# Patient Record
Sex: MALE | Race: WHITE
[De-identification: names, ages, dates, MRNs, and addresses within clinical notes are randomized per-mention and may not be internally consistent; named-entity substitution may affect disease eponyms.]

---

## 2022-07-22 ENCOUNTER — HOSPITAL ENCOUNTER (EMERGENCY)
Dept: HOSPITAL 97 - ER | Age: 47
Discharge: HOME | End: 2022-07-22
Payer: COMMERCIAL

## 2022-07-22 VITALS — TEMPERATURE: 98.5 F

## 2022-07-22 VITALS — OXYGEN SATURATION: 97 %

## 2022-07-22 VITALS — SYSTOLIC BLOOD PRESSURE: 151 MMHG | DIASTOLIC BLOOD PRESSURE: 109 MMHG

## 2022-07-22 DIAGNOSIS — R07.89: Primary | ICD-10-CM

## 2022-07-22 DIAGNOSIS — I11.9: ICD-10-CM

## 2022-07-22 DIAGNOSIS — I10: ICD-10-CM

## 2022-07-22 DIAGNOSIS — N19: ICD-10-CM

## 2022-07-22 LAB
ALBUMIN SERPL BCP-MCNC: 3.3 G/DL (ref 3.4–5)
ALP SERPL-CCNC: 69 U/L (ref 45–117)
ALT SERPL W P-5'-P-CCNC: 73 U/L (ref 12–78)
AST SERPL W P-5'-P-CCNC: 29 U/L (ref 15–37)
BUN BLD-MCNC: 19 MG/DL (ref 7–18)
GLUCOSE SERPLBLD-MCNC: 119 MG/DL (ref 74–106)
HCT VFR BLD CALC: 46.4 % (ref 39.6–49)
INR BLD: 0.98
LYMPHOCYTES # SPEC AUTO: 1.6 K/UL (ref 0.7–4.9)
MAGNESIUM SERPL-MCNC: 1.7 MG/DL (ref 1.8–2.4)
MCV RBC: 93.4 FL (ref 80–100)
NT-PROBNP SERPL-MCNC: 27 PG/ML (ref ?–125)
PMV BLD: 7.3 FL (ref 7.6–11.3)
POTASSIUM SERPL-SCNC: 4.1 MMOL/L (ref 3.5–5.1)
RBC # BLD: 4.97 M/UL (ref 4.33–5.43)
TROPONIN I SERPL HS-MCNC: 3.2 PG/ML (ref ?–58.9)

## 2022-07-22 PROCEDURE — 83880 ASSAY OF NATRIURETIC PEPTIDE: CPT

## 2022-07-22 PROCEDURE — 85610 PROTHROMBIN TIME: CPT

## 2022-07-22 PROCEDURE — 80076 HEPATIC FUNCTION PANEL: CPT

## 2022-07-22 PROCEDURE — 99284 EMERGENCY DEPT VISIT MOD MDM: CPT

## 2022-07-22 PROCEDURE — 84484 ASSAY OF TROPONIN QUANT: CPT

## 2022-07-22 PROCEDURE — 80048 BASIC METABOLIC PNL TOTAL CA: CPT

## 2022-07-22 PROCEDURE — 82550 ASSAY OF CK (CPK): CPT

## 2022-07-22 PROCEDURE — 83735 ASSAY OF MAGNESIUM: CPT

## 2022-07-22 PROCEDURE — 85025 COMPLETE CBC W/AUTO DIFF WBC: CPT

## 2022-07-22 PROCEDURE — 93005 ELECTROCARDIOGRAM TRACING: CPT

## 2022-07-22 PROCEDURE — 36415 COLL VENOUS BLD VENIPUNCTURE: CPT

## 2022-07-22 PROCEDURE — 96360 HYDRATION IV INFUSION INIT: CPT

## 2022-07-22 PROCEDURE — 71045 X-RAY EXAM CHEST 1 VIEW: CPT

## 2022-07-22 NOTE — EDPHYS
Physician Documentation                                                                           

 [facility]                                                                 

Name: Barry Beltran                                                                               

Age: 46 yrs                                                                                       

Sex: Male                                                                                         

: 1975                                                                                   

MRN: [id number]                                                                                   

Arrival Date: 2022                                                                          

Time: 16:30                                                                                       

Account#: [id number]                                                                            

Bed 24                                                                                            

Private MD:                                                                                       

ED Physician Jose Aguilar                                                                         

HPI:                                                                                              

                                                                                             

16:55 This 46 yrs old Male presents to ER via Ambulatory with complaints of Chest Pain.       cp  

16:55 The patient or guardian reports chest pain that is located primarily in the anterior    cp  

      chest wall.                                                                                 

16:55 Onset: 1 week(s) ago. The pain does not radiate. Associated signs and symptoms:         cp  

      Pertinent negatives: abdominal pain, cough, diaphoresis, dizziness, headache, lower         

      extremity pain, lower extremity swelling, shortness of breath, syncope. The chest pain      

      is described as a pressure. Duration: The patient or guardian reports multiple              

      episodes, that wax and wane. Modifying factors: The symptoms are alleviated by nothing.     

      the symptoms are aggravated by nothing. Severity of pain: in the emergency department       

      the pain is unchanged despite home interventions. Patient reports history of HTN and        

      taking prescribed Lisinopril in the past. Reports running out of medication about 1         

      month ago. Patient denies family history of early cardiac death and/or first degree         

      relative with MI at young age.                                                              

                                                                                                  

Historical:                                                                                       

- Allergies:                                                                                      

16:54 No Known Allergies;                                                                     ld1 

- Home Meds:                                                                                      

16:54 None [Active];                                                                          ld1 

- PMHx:                                                                                           

16:54 Hypertensive disorder; Hypercholesterolemia;                                            ld1 

- PSHx:                                                                                           

16:54 None;                                                                                   ld1 

                                                                                                  

- Immunization history:: Adult Immunizations up to date, Client reports receiving the             

  2nd dose of the Covid vaccine.                                                                  

- Social history:: Smoking status: Patient denies any tobacco usage or history of.                

  Patient uses alcohol, on a daily basis.                                                         

                                                                                                  

                                                                                                  

ROS:                                                                                              

17:00 Constitutional: Negative for body aches, chills, fever, poor PO intake.                 cp  

17:00 Cardiovascular: Positive for chest pain, Negative for edema, palpitations.              cp  

17:00 Respiratory: Negative for cough, shortness of breath, wheezing.                             

17:00 Eyes: Negative for injury, pain, redness, and discharge.                                cp  

17:00 Abdomen/GI: Negative for abdominal pain, nausea, vomiting, and diarrhea.                    

17:00 ENT: Negative for drainage from ear(s), ear pain, sore throat, difficulty swallowing,   cp  

      difficulty handling secretions.                                                             

17:00 Back: Negative for pain at rest, pain with movement.                                        

17:00 : Negative for urinary symptoms.                                                          

17:00 Neuro: Negative for altered mental status, dizziness, headache, numbness, syncope,          

      weakness.                                                                                   

17:00 All other systems are negative.                                                             

                                                                                                  

Exam:                                                                                             

17:00 ECG was reviewed by the Attending Physician.                                            cp  

17:05 Constitutional: The patient appears in no acute distress, alert, awake,                 cp  

      non-diaphoretic, non-toxic, well developed, well nourished.                                 

17:05 Head/Face:  Normocephalic, atraumatic.                                                  cp  

17:05 Eyes: Periorbital structures: appear normal, Conjunctiva: normal, no exudate, no            

      injection, Sclera: no appreciated abnormality, Lids and lashes: appear normal,              

      bilaterally.                                                                                

17:05 ENT: External ear(s): are unremarkable, Nose: is normal, Mouth: Lips: moist, Oral           

      mucosa: pink and intact, moist, Posterior pharynx: Airway: no evidence of obstruction,      

      patent.                                                                                     

17:05 Chest/axilla: Inspection: normal, Palpation: is normal, no crepitus, no tenderness.         

17:05 Cardiovascular: Rate: normal, Rhythm: regular, Edema: is not appreciated.                   

17:05 Respiratory: the patient does not display signs of respiratory distress,  Respirations:     

      normal, no use of accessory muscles, no retractions, labored breathing, is not present,     

      Breath sounds: are clear throughout, no decreased breath sounds, no stridor, no             

      wheezing.                                                                                   

17:05 Abdomen/GI: Inspection: abdomen appears normal, Palpation: abdomen is soft and              

      non-tender, in all quadrants.                                                               

17:05 Back: pain, is absent, ROM is normal.                                                       

17:05 Neuro: Orientation: to person, place \T\ time. Mentation: is normal, Cerebellar function:   

      is grossly normal, Motor: moves all fours, strength is normal, Sensation: is normal.        

                                                                                                  

Vital Signs:                                                                                      

16:53  / 108; Pulse 90; Resp 18; Temp 98.5(O); Pulse Ox 98% on R/A; Weight 99.79 kg;    ld1 

      Height 6 ft. 0 in. (182.88 cm); Pain 4/10;                                                  

18:06  / 100; Pulse 80; Resp 16; Pulse Ox 97% on R/A;                                   jb4 

19:15  / 102; Pulse 76; Resp 16; Pulse Ox 99% on R/A;                                   jb4 

20:21  / 102; Pulse 68; Resp 18; Pulse Ox 97% on R/A;                                   jb4 

21:15  / 109; Pulse 62; Resp 15; Pulse Ox 97% on R/A;                                   jb4 

16:53 Body Mass Index 29.84 (99.79 kg, 182.88 cm)                                             [facility] 

                                                                                                  

MDM:                                                                                              

17:00 Differential diagnosis: acute myocardial infarction, pleurisy, pneumonia, pneumothorax, cp  

      stable angina, unstable angina.                                                             

17:19 Patient medically screened.                                                             cp  

21:25 Data reviewed: vital signs, nurses notes, lab test result(s), EKG, radiologic studies,  cp  

      plain films.                                                                                

21:25 Test interpretation: by ED physician or midlevel provider: ECG, plain radiologic        cp  

      studies. Counseling: I had a detailed discussion with the patient and/or guardian           

      regarding: the historical points, exam findings, and any diagnostic results supporting      

      the discharge/admit diagnosis, lab results, radiology results, the need for outpatient      

      follow up, a family practitioner, to return to the emergency department if symptoms         

      worsen or persist or if there are any questions or concerns that arise at home.             

      Response to treatment: the patient's symptoms have mildly improved after treatment, and     

      as a result, I will discharge patient.                                                      

                                                                                                  

                                                                                             

16:52 Order name: Basic Metabolic Panel; Complete Time: 17:53                                                                                                                              

17:53 Interpretation: Normal except: GLUC 119; BUN 19; CRE 2.10; GFR 39.                      cp  

                                                                                             

16:52 Order name: CBC with Diff; Complete Time: 17:53                                                                                                                                      

16:52 Order name: Troponin HS; Complete Time: 17:53                                                                                                                                        

17:03 Order name: LFT's; Complete Time: 17:53                                                   

                                                                                             

17:03 Order name: Magnesium; Complete Time: 17:53                                             cp  

                                                                                             

17:03 Order name: NT PRO-BNP; Complete Time: 17:53                                            cp  

                                                                                             

16:52 Order name: XRAY Chest (1 view)                                                         [facility] 

                                                                                             

17:03 Order name: PT-INR; Complete Time: 17:53                                                cp  

                                                                                             

17:54 Order name: CK; Complete Time: 21:04                                                    cp  

                                                                                             

19:40 Order name: Troponin High Sensitivity: repeat 2030; Complete Time: 21:24                cp  

                                                                                             

16:52 Order name: EKG; Complete Time: 16:53                                                                                                                                                

16:52 Order name: Cardiac monitoring; Complete Time: 17:17                                                                                                                                 

16:52 Order name: EKG - Nurse/Tech; Complete Time: 17:16                                      ld                                                                                             

16:52 Order name: IV Saline Lock; Complete Time: 17:16                                                                                                                                     

16:52 Order name: Labs collected and sent; Complete Time: 17:16                               ld                                                                                             

16:52 Order name: O2 Per Protocol; Complete Time: 16:53                                       ld                                                                                             

16:52 Order name: O2 Sat Monitoring; Complete Time: 16:53                                     ld 

                                                                                                  

EC:00 Rate is 84 beats/min. Rhythm is regular. NM interval is normal. QRS interval is normal. cp  

      QT interval is normal. T waves are Inverted in lead aVR. Interpreted by me. Reviewed by     

      me.                                                                                         

                                                                                                  

Administered Medications:                                                                         

17:29 Drug: Aspirin Chewable Tablet 324 mg Route: PO;                                         jb4 

18:13 Follow up: Response: No adverse reaction                                                [facility] 

17:29 Drug: Nitroglycerin 0.4 mg Route: Sublingual;                                           jb4 

18:12 Follow up: Response: No adverse reaction; No change in condition                        [facility] 

18:12 Drug: NS 0.9% 1000 ml Route: IV; Rate: 1 bolus; Site: right antecubital;                jb4 

19:15 Follow up: Response: No adverse reaction; IV Status: Completed infusion; IV Intake:     jb4 

      1000ml                                                                                      

19:20 Drug: Metoprolol 25 mg Route: PO;                                                         

21:38 Follow up: Response: No adverse reaction                                                jb4 

                                                                                                  

                                                                                                  

Disposition Summary:                                                                              

22 21:25                                                                                    

Discharge Ordered                                                                                 

      Location: Home                                                                          jmm 

      Problem: new                                                                            jmm 

      Symptoms: have improved                                                                 jmm 

      Condition: Stable                                                                       jmm 

      Diagnosis                                                                                   

        - Chest pain, unspecified                                                             jmm 

        - Hypertensive heart disease without heart failure                                    jmm 

        - Unspecified kidney failure                                                          jmm 

      Followup:                                                                               cp  

        - With: Private Physician                                                                  

        - When: 2 - 3 days                                                                         

        - Reason: Recheck today's complaints                                                       

      Discharge Instructions:                                                                     

        - Discharge Summary Sheet                                                             cp  

        - Nonspecific Chest Pain, Adult                                                       cp  

        - Hypertension, Adult                                                                 cp  

        - Aspirin and Your Heart                                                              cp  

        - Form - Blood Pressure Record Sheet                                                  cp  

        - How to Take Your Blood Pressure                                                     cp  

      Forms:                                                                                      

        - Medication Reconciliation Form                                                      jm 

        - Thank You Letter                                                                    jmm 

        - Antibiotic Education                                                                jmm 

        - Prescription Opioid Use                                                             [facility] 

      Prescriptions:                                                                              

        - Metoprolol Tartrate 25 mg Oral Tablet                                                    

            - take 1 tablet by ORAL route 2 times per day with a meal; 60 tablet; Refills: 0, [facility] 

      Product Selection Permitted                                                                 

Signatures:                                                                                       

Dispatcher [employer]                           Magi Lilly RN RN kl Mickail, Joel, PA PA jmm Page, Corey, PA PA cp Bryson, James, RN RN   jb4                                                  

Lisa Peña RN                     RN   ld1                                                  

                                                                                                  

Corrections: (The following items were deleted from the chart)                                    

                                                                                             

20:05  16:55 Patient reports history of HTN and taking prescribed Lisinopril in the      cp  

      past. Reports running out of medication about 1 month ago. cp                               

                                                                                                  

**************************************************************************************************

## 2022-07-22 NOTE — ER
Nurse's Notes                                                                                     

 [facility]                                                                 

Name: Barry Beltran                                                                               

Age: 46 yrs                                                                                       

Sex: Male                                                                                         

: 1975                                                                                   

MRN: [id number]                                                                                   

Arrival Date: 2022                                                                          

Time: 16:30                                                                                       

Account#: [id number]                                                                            

Bed 24                                                                                            

Private MD:                                                                                       

Diagnosis: Chest pain, unspecified;Hypertensive heart disease without heart failure;Unspecified   

  kidney failure                                                                                  

                                                                                                  

Presentation:                                                                                     

                                                                                             

16:53 Chief complaint: Patient states: Chest pain on and off all week. Reports pain being     ld1 

      worse today. Stated, "I am out of my blood pressure pills, I haven't taken them in over     

      a month.". Coronavirus screen: At this time, the client does not indicate any symptoms      

      associated with coronavirus-19. Ebola Screen: No symptoms or risks identified at this       

      time. Initial Sepsis Screen: Does the patient meet any 2 criteria? No. Patient's            

      initial sepsis screen is negative. Does the patient have a suspected source of              

      infection? No. Patient's initial sepsis screen is negative. Risk Assessment: Do you         

      want to hurt yourself or someone else? Patient reports no desire to harm self or            

      others. Onset of symptoms was 2022.                                                

16:53 Method Of Arrival: Ambulatory                                                           ld1 

16:53 Acuity: ZAHRAA 3                                                                           ld1 

                                                                                                  

Triage Assessment:                                                                                

16:54 General: Appears in no apparent distress. comfortable, Behavior is cooperative,         ld1 

      appropriate for age, anxious. Pain: Complains of pain in chest Pain does not radiate.       

      Pain currently is 4 out of 10 on a pain scale. at worst was 10 out of 10 on a pain          

      scale. Quality of pain is described as pressure, throbbing. EENT: No signs and/or           

      symptoms were reported regarding the EENT system. Neuro: Level of Consciousness is          

      awake, alert, obeys commands, Oriented to person, place, time, situation, Appropriate       

      for age. Cardiovascular: Capillary refill < 3 seconds Patient's skin is warm and dry.       

      Rhythm is regular. Respiratory: Airway is patent Respiratory effort is even, unlabored.     

      GI: Abdomen is round non-distended. : No signs and/or symptoms were reported              

      regarding the genitourinary system. Derm: No signs and/or symptoms reported regarding       

      the dermatologic system. Musculoskeletal: No signs and/or symptoms reported regarding       

      the musculoskeletal system.                                                                 

                                                                                                  

Historical:                                                                                       

- Allergies:                                                                                      

16:54 No Known Allergies;                                                                     ld1 

- Home Meds:                                                                                      

16:54 None [Active];                                                                          ld1 

- PMHx:                                                                                           

16:54 Hypertensive disorder; Hypercholesterolemia;                                            ld1 

- PSHx:                                                                                           

16:54 None;                                                                                   ld1 

                                                                                                  

- Immunization history:: Adult Immunizations up to date, Client reports receiving the             

  2nd dose of the Covid vaccine.                                                                  

- Social history:: Smoking status: Patient denies any tobacco usage or history of.                

  Patient uses alcohol, on a daily basis.                                                         

                                                                                                  

                                                                                                  

Screenin:00 Abuse screen: Denies threats or abuse. Nutritional screening: No deficits noted.        jb4 

      Tuberculosis screening: No symptoms or risk factors identified. Fall Risk None              

      identified.                                                                                 

                                                                                                  

Assessment:                                                                                       

17:34 General: Appears in no apparent distress. uncomfortable, Behavior is calm, cooperative, jb4 

      appropriate for age. Pain: Complains of pain in chest Pain does not radiate. Pain           

      currently is 4 out of 10 on a pain scale. Quality of pain is described as pressure.         

      Neuro: Level of Consciousness is awake, alert, obeys commands, Oriented to person,          

      place, time, situation. Cardiovascular: Patient's skin is warm and dry. Respiratory:        

      Airway is patent Respiratory effort is even, unlabored, Respiratory pattern is regular,     

      symmetrical. Derm: Skin is intact, Skin is pink, warm \T\ dry. Musculoskeletal:             

      Circulation, motion, and sensation intact. Range of motion: intact in all extremities.      

18:06 Reassessment: Patient appears in no apparent distress at this time. Patient and/or      jb4 

      family updated on plan of care and expected duration. Pain level reassessed. Patient is     

      alert, oriented x 3, equal unlabored respirations, skin warm/dry/pink.                      

19:20 Reassessment: Patient appears in no apparent distress at this time. Patient and/or      jb4 

      family updated on plan of care and expected duration. Pain level reassessed. Patient is     

      alert, oriented x 3, equal unlabored respirations, skin warm/dry/pink.                      

20:21 Reassessment: No changes from previously documented assessment. Patient and/or family   jb4 

      updated on plan of care and expected duration. Pain level reassessed. Patient is alert,     

      oriented x 3, equal unlabored respirations, skin warm/dry/pink.                             

21:24 Reassessment: Patient appears in no apparent distress at this time. Patient and/or      jb4 

      family updated on plan of care and expected duration. Pain level reassessed. Patient is     

      alert, oriented x 3, equal unlabored respirations, skin warm/dry/pink.                      

                                                                                                  

Vital Signs:                                                                                      

16:53  / 108; Pulse 90; Resp 18; Temp 98.5(O); Pulse Ox 98% on R/A; Weight 99.79 kg;    ld1 

      Height 6 ft. 0 in. (182.88 cm); Pain 4/10;                                                  

18:06  / 100; Pulse 80; Resp 16; Pulse Ox 97% on R/A;                                   jb4 

19:15  / 102; Pulse 76; Resp 16; Pulse Ox 99% on R/A;                                   jb4 

20:21  / 102; Pulse 68; Resp 18; Pulse Ox 97% on R/A;                                   jb4 

21:15  / 109; Pulse 62; Resp 15; Pulse Ox 97% on R/A;                                   jb4 

16:53 Body Mass Index 29.84 (99.79 kg, 182.88 cm)                                             ld1 

                                                                                                  

ED Course:                                                                                        

16:30 Patient arrived in ED.                                                                  mr  

16:39 Harjinder Green PA is PHCP.                                                                cp  

16:39 Jose Aguilar MD is Attending Physician.                                                cp  

16:54 Triage completed.                                                                       ld1 

16:54 Arm band placed on right wrist. EKG completed in triage. Results shown to MD.           ld1 

17:15 Patient maintains SpO2 saturation greater than 95% on room air.                         jb4 

17:16 Inserted saline lock: 20 gauge in right antecubital area, using aseptic technique.      kc6 

      Blood collected.                                                                            

17:16 LFT's Sent.                                                                             kc6 

17:16 Magnesium Sent.                                                                         kc6 

17:16 NT PRO-BNP Sent.                                                                        kc6 

17:16 PT-INR Sent.                                                                            kc6 

17:16 Basic Metabolic Panel Sent.                                                             kc6 

17:16 CBC with Diff Sent.                                                                     kc6 

17:16 Troponin HS Sent.                                                                       kc6 

17:19 Joe Butt, RN is Primary Nurse.                                                     jb4 

17:36 XRAY Chest (1 view) In Process Unspecified.                                             EDMS

21:03 PHCP role handed off by Harjinder Green PA                                                 [facility] 

21:03 Sincere Zhong PA is PHCP.                                                              [facility] 

21:38 No provider procedures requiring assistance completed. IV discontinued, intact,         jb4 

      bleeding controlled, No redness/swelling at site. Pressure dressing applied.                

                                                                                                  

Administered Medications:                                                                         

17:29 Drug: Aspirin Chewable Tablet 324 mg Route: PO;                                         jb4 

18:13 Follow up: Response: No adverse reaction                                                jb4 

17:29 Drug: Nitroglycerin 0.4 mg Route: Sublingual;                                           jb4 

18:12 Follow up: Response: No adverse reaction; No change in condition                        jb4 

18:12 Drug: NS 0.9% 1000 ml Route: IV; Rate: 1 bolus; Site: right antecubital;                jb4 

19:15 Follow up: Response: No adverse reaction; IV Status: Completed infusion; IV Intake:     jb4 

      1000ml                                                                                      

19:20 Drug: Metoprolol 25 mg Route: PO;                                                         

21:38 Follow up: Response: No adverse reaction                                                jb4 

                                                                                                  

                                                                                                  

Intake:                                                                                           

19:15 IV: 1000ml; Total: 1000ml.                                                              jb4 

                                                                                                  

Outcome:                                                                                          

21:25 Discharge ordered by MD.                                                                shanice 

21:38 Discharged to home ambulatory.                                                          jb4 

21:38 Condition: stable                                                                           

21:38 Discharge instructions given to patient, Instructed on discharge instructions, follow       

      up and referral plans. medication usage, Demonstrated understanding of instructions,        

      follow-up care, medications, Prescriptions given X 1.                                       

21:39 Patient left the ED.                                                                    jb4 

                                                                                                  

Signatures:                                                                                       

Dispatcher [employer]                           EDMS                                                 

Magi Forbes, Sincere Arnold RN, PA PA jmm Rivera, Mary                                 mr                                                   

Harjinder Green PA PA cp Bryson, James, RN RN   jb4                                                  

Lisa Peña RN                     RN   ld1                                                  

Lian Van                            kc6                                                  

                                                                                                  

**************************************************************************************************

## 2022-07-25 NOTE — EKG
Test Date:    2022-07-22               Test Time:    16:52:12

Technician:   DAMION                                    

                                                     

MEASUREMENT RESULTS:                                       

Intervals:                                           

Rate:         84                                     

MI:           170                                    

QRSD:         82                                     

QT:           358                                    

QTc:          423                                    

Axis:                                                

P:            46                                     

MI:           170                                    

QRS:          88                                     

T:            51                                     

                                                     

INTERPRETIVE STATEMENTS:                                       

                                                     

Normal sinus rhythm

Normal ECG

Compared to ECG 12/12/2014 08:22:19

Sinus bradycardia no longer present



Electronically Signed On 07-25-22 09:25:18 CDT by Nik Coronado